# Patient Record
Sex: FEMALE | Race: WHITE
[De-identification: names, ages, dates, MRNs, and addresses within clinical notes are randomized per-mention and may not be internally consistent; named-entity substitution may affect disease eponyms.]

---

## 2018-11-21 PROBLEM — Z00.00 ENCOUNTER FOR PREVENTIVE HEALTH EXAMINATION: Status: ACTIVE | Noted: 2018-11-21

## 2018-12-27 ENCOUNTER — APPOINTMENT (OUTPATIENT)
Dept: ORTHOPEDIC SURGERY | Facility: CLINIC | Age: 83
End: 2018-12-27
Payer: MEDICARE

## 2018-12-27 VITALS — HEIGHT: 66 IN | BODY MASS INDEX: 21.69 KG/M2 | WEIGHT: 135 LBS | RESPIRATION RATE: 16 BRPM

## 2018-12-27 DIAGNOSIS — Z87.891 PERSONAL HISTORY OF NICOTINE DEPENDENCE: ICD-10-CM

## 2018-12-27 DIAGNOSIS — G56.03 CARPAL TUNNEL SYNDROM,BILATERAL UPPER LIMBS: ICD-10-CM

## 2018-12-27 DIAGNOSIS — I10 ESSENTIAL (PRIMARY) HYPERTENSION: ICD-10-CM

## 2018-12-27 DIAGNOSIS — M19.90 UNSPECIFIED OSTEOARTHRITIS, UNSPECIFIED SITE: ICD-10-CM

## 2018-12-27 PROCEDURE — 99203 OFFICE O/P NEW LOW 30 MIN: CPT

## 2018-12-27 RX ORDER — VALACYCLOVIR HYDROCHLORIDE 500 MG/1
500 TABLET, FILM COATED ORAL
Refills: 0 | Status: ACTIVE | COMMUNITY

## 2018-12-27 RX ORDER — CETIRIZINE HCL 10 MG
10 TABLET ORAL
Refills: 0 | Status: ACTIVE | COMMUNITY

## 2018-12-27 RX ORDER — TOLTERODINE TARTRATE 4 MG/1
4 CAPSULE, EXTENDED RELEASE ORAL
Refills: 0 | Status: ACTIVE | COMMUNITY

## 2018-12-27 RX ORDER — OLMESARTAN MEDOXOMIL 40 MG/1
40 TABLET, FILM COATED ORAL
Refills: 0 | Status: ACTIVE | COMMUNITY

## 2018-12-27 RX ORDER — ASPIRIN 81 MG
81 TABLET, DELAYED RELEASE (ENTERIC COATED) ORAL
Refills: 0 | Status: ACTIVE | COMMUNITY

## 2018-12-27 RX ORDER — AMLODIPINE BESYLATE 5 MG/1
5 TABLET ORAL
Refills: 0 | Status: ACTIVE | COMMUNITY

## 2018-12-27 RX ORDER — BISOPROLOL FUMARATE 5 MG/1
5 TABLET, FILM COATED ORAL
Refills: 0 | Status: ACTIVE | COMMUNITY

## 2023-03-10 ENCOUNTER — EMERGENCY (EMERGENCY)
Facility: HOSPITAL | Age: 88
LOS: 1 days | Discharge: HOME | End: 2023-03-10
Admitting: EMERGENCY MEDICINE
Payer: MEDICARE

## 2023-03-10 VITALS
TEMPERATURE: 98 F | OXYGEN SATURATION: 95 % | HEIGHT: 66 IN | SYSTOLIC BLOOD PRESSURE: 176 MMHG | HEART RATE: 92 BPM | WEIGHT: 134.92 LBS | DIASTOLIC BLOOD PRESSURE: 83 MMHG | RESPIRATION RATE: 16 BRPM

## 2023-03-10 PROCEDURE — 99285 EMERGENCY DEPT VISIT HI MDM: CPT

## 2023-03-10 PROCEDURE — 73030 X-RAY EXAM OF SHOULDER: CPT | Mod: 26,RT

## 2023-03-10 PROCEDURE — 73080 X-RAY EXAM OF ELBOW: CPT | Mod: 26,RT

## 2023-03-10 PROCEDURE — 73060 X-RAY EXAM OF HUMERUS: CPT | Mod: 26,RT

## 2023-03-10 PROCEDURE — 73060 X-RAY EXAM OF HUMERUS: CPT

## 2023-03-10 PROCEDURE — 73080 X-RAY EXAM OF ELBOW: CPT

## 2023-03-10 PROCEDURE — 73030 X-RAY EXAM OF SHOULDER: CPT

## 2023-03-10 PROCEDURE — 99284 EMERGENCY DEPT VISIT MOD MDM: CPT

## 2023-03-10 RX ORDER — IBUPROFEN 200 MG
600 TABLET ORAL ONCE
Refills: 0 | Status: COMPLETED | OUTPATIENT
Start: 2023-03-10 | End: 2023-03-10

## 2023-03-10 RX ADMIN — Medication 600 MILLIGRAM(S): at 20:43

## 2023-03-10 NOTE — ED PROVIDER NOTE - OBJECTIVE STATEMENT
87-year-old female with past medical history of hypertension, RHD complaining of right shoulder/arm pain status post trip and fall just prior to arrival.  Patient was getting into a cab, lost her balance and fell onto her right shoulder.  Denies head strike.  Patient not on any anticoagulation.   Admits to pain when she tries to move her arm.  Denies headache, neck pain, chest pain, shortness of breath.  Last tetanus 2019.

## 2023-03-10 NOTE — ED PROVIDER NOTE - NSFOLLOWUPINSTRUCTIONS_ED_ALL_ED_FT
Humerus Fracture Treated With Immobilization    Right arm and hand showing skeleton with a humerus fracture.   A humerus fracture is a break in the large bone in the upper arm (humerus). If the joint is stable and the bones are still in their normal position (nondisplaced), the injury may be treated with immobilization. This involves the use of a cast, splint, or sling to hold your arm in place. Immobilization ensures that your bones continue to stay in the correct position while your arm is healing.      What are the causes?    This condition may be caused by:  •A fall.      •A hard, direct hit to the arm.      •A motor vehicle accident.        What increases the risk?    The following factors may make you more likely to develop this condition:  •Having a disease that makes the bones thin and weak.      •Being elderly.        What are the signs or symptoms?    Symptoms of this condition include:  •Pain.      •Swelling.      •Bruising.      •Not being able to move your arm normally.        How is this diagnosed?    This condition may be diagnosed based on:  •A physical exam.      •X-rays of your upper arm, elbow, and shoulder.      •CT scan.        How is this treated?    Treatment for this condition involves wearing a cast, splint, or sling until the injured area is stable enough for you to begin range-of-motion exercises. You may also be prescribed pain medicine.      Follow these instructions at home:    If you have a nonremovable cast or splint:     • Do not put pressure on any part of the cast or splint until it is fully hardened. This may take several hours.      • Do not stick anything inside the cast or splint to scratch your skin. Doing that increases your risk of infection.      •Check the skin around the cast or splint every day. Tell your health care provider about any concerns.      •You may put lotion on dry skin around the edges of the cast or splint. Do not put lotion on the skin underneath the cast or splint.      •Keep the cast or splint clean and dry.      If you have a removable splint or sling:     •Wear the splint or sling as told by your health care provider. Remove it only as told by your health care provider.      •Check the skin around the splint or sling every day. Tell your health care provider about any concerns.      •Loosen the splint or sling if your fingers tingle, become numb, or turn cold and blue.      •Keep the splint or sling clean and dry.      Bathing     • Do not take baths, swim, or use a hot tub until your health care provider approves. Ask your health care provider if you may take showers. You may only be allowed to take sponge baths.    •If the cast, splint, or sling is not waterproof:  •Do not let it get wet.      •Cover it with a watertight covering when you take a bath or shower.          Managing pain, stiffness, and swelling   Bag of ice on a towel on the skin.  •If directed, put ice on the injured area. To do this:  •If you have a removable splint or sling, remove it as told by your health care provider.      •Put ice in a plastic bag.      •Place a towel between your skin and the bag or between your nonremovable cast or sling and the bag.      •Leave the ice on for 20 minutes, 2–3 times a day.      •Remove the ice if your skin turns bright red. This is very important. If you cannot feel pain, heat, or cold, you have a greater risk of damage to the area.        •Move your fingers often to reduce stiffness and swelling.      •Raise the injured area above the level of your heart while you are sitting or lying down.      Driving     • Do not drive or operate machinery while taking prescription pain medicine.      •Ask your health care provider when it is safe to drive if you have a cast, splint, or sling on your arm.      Activity     • Do not lift anything until your health care provider says that it is safe.      •Return to your normal activities as told by your health care provider. Ask your health care provider what activities are safe for you.      •Do range-of-motion exercises only as told by your health care provider or physical therapist.      General instructions     • Do not use any products that contain nicotine or tobacco. These products include cigarettes, chewing tobacco, and vaping devices, such as e-cigarettes. These can delay bone healing. If you need help quitting, ask your health care provider.      •Take over-the-counter and prescription medicines only as told by your health care provider.    •Ask your health care provider if the medicine prescribed to you:•Can cause constipation. You may need to take these actions to prevent or treat constipation:  •Drink enough fluid to keep your urine pale yellow.      •Take over-the-counter or prescription medicines.      •Eat foods that are high in fiber, such as beans, whole grains, and fresh fruits and vegetables.      •Limit foods that are high in fat and processed sugars, such as fried or sweet foods.          •Keep all follow-up visits. This is important.        Contact a health care provider if:    •You have any new pain, swelling, or bruising.      •Your pain, swelling, and bruising do not improve.      •Your cast, splint, or sling becomes loose or damaged.        Get help right away if:    •Your skin or fingers on your injured arm turn blue or gray.      •Your arm feels cold or numb.      •You have severe pain in your injured arm.        Summary    •A humerus fracture is a break in the large bone in the upper arm.      •Immobilization involves the use of a cast, splint, or sling to hold your arm in place while the injury heals.      •Wear a splint or sling as told by your health care provider. Remove it only as told by your health care provider.      •Move your fingers often to reduce stiffness and swelling.      This information is not intended to replace advice given to you by your health care provider. Make sure you discuss any questions you have with your health care provider. You have a fracture of the top of your shoulder.  You need to keep this in a sling.  Take Tylenol or Motrin as needed for pain.  Follow-up with the orthopedic surgeon within 1 week.  Return to ED for severe pain, headache, dizziness, numbness, tingling or any other concerning symptoms.    Humerus Fracture Treated With Immobilization    Right arm and hand showing skeleton with a humerus fracture.   A humerus fracture is a break in the large bone in the upper arm (humerus). If the joint is stable and the bones are still in their normal position (nondisplaced), the injury may be treated with immobilization. This involves the use of a cast, splint, or sling to hold your arm in place. Immobilization ensures that your bones continue to stay in the correct position while your arm is healing.      What are the causes?    This condition may be caused by:  •A fall.      •A hard, direct hit to the arm.      •A motor vehicle accident.        What increases the risk?    The following factors may make you more likely to develop this condition:  •Having a disease that makes the bones thin and weak.      •Being elderly.        What are the signs or symptoms?    Symptoms of this condition include:  •Pain.      •Swelling.      •Bruising.      •Not being able to move your arm normally.        How is this diagnosed?    This condition may be diagnosed based on:  •A physical exam.      •X-rays of your upper arm, elbow, and shoulder.      •CT scan.        How is this treated?    Treatment for this condition involves wearing a cast, splint, or sling until the injured area is stable enough for you to begin range-of-motion exercises. You may also be prescribed pain medicine.      Follow these instructions at home:    If you have a nonremovable cast or splint:     • Do not put pressure on any part of the cast or splint until it is fully hardened. This may take several hours.      • Do not stick anything inside the cast or splint to scratch your skin. Doing that increases your risk of infection.      •Check the skin around the cast or splint every day. Tell your health care provider about any concerns.      •You may put lotion on dry skin around the edges of the cast or splint. Do not put lotion on the skin underneath the cast or splint.      •Keep the cast or splint clean and dry.      If you have a removable splint or sling:     •Wear the splint or sling as told by your health care provider. Remove it only as told by your health care provider.      •Check the skin around the splint or sling every day. Tell your health care provider about any concerns.      •Loosen the splint or sling if your fingers tingle, become numb, or turn cold and blue.      •Keep the splint or sling clean and dry.      Bathing     • Do not take baths, swim, or use a hot tub until your health care provider approves. Ask your health care provider if you may take showers. You may only be allowed to take sponge baths.    •If the cast, splint, or sling is not waterproof:  •Do not let it get wet.      •Cover it with a watertight covering when you take a bath or shower.          Managing pain, stiffness, and swelling   Bag of ice on a towel on the skin.  •If directed, put ice on the injured area. To do this:  •If you have a removable splint or sling, remove it as told by your health care provider.      •Put ice in a plastic bag.      •Place a towel between your skin and the bag or between your nonremovable cast or sling and the bag.      •Leave the ice on for 20 minutes, 2–3 times a day.      •Remove the ice if your skin turns bright red. This is very important. If you cannot feel pain, heat, or cold, you have a greater risk of damage to the area.        •Move your fingers often to reduce stiffness and swelling.      •Raise the injured area above the level of your heart while you are sitting or lying down.      Driving     • Do not drive or operate machinery while taking prescription pain medicine.      •Ask your health care provider when it is safe to drive if you have a cast, splint, or sling on your arm.      Activity     • Do not lift anything until your health care provider says that it is safe.      •Return to your normal activities as told by your health care provider. Ask your health care provider what activities are safe for you.      •Do range-of-motion exercises only as told by your health care provider or physical therapist.      General instructions     • Do not use any products that contain nicotine or tobacco. These products include cigarettes, chewing tobacco, and vaping devices, such as e-cigarettes. These can delay bone healing. If you need help quitting, ask your health care provider.      •Take over-the-counter and prescription medicines only as told by your health care provider.    •Ask your health care provider if the medicine prescribed to you:•Can cause constipation. You may need to take these actions to prevent or treat constipation:  •Drink enough fluid to keep your urine pale yellow.      •Take over-the-counter or prescription medicines.      •Eat foods that are high in fiber, such as beans, whole grains, and fresh fruits and vegetables.      •Limit foods that are high in fat and processed sugars, such as fried or sweet foods.          •Keep all follow-up visits. This is important.        Contact a health care provider if:    •You have any new pain, swelling, or bruising.      •Your pain, swelling, and bruising do not improve.      •Your cast, splint, or sling becomes loose or damaged.        Get help right away if:    •Your skin or fingers on your injured arm turn blue or gray.      •Your arm feels cold or numb.      •You have severe pain in your injured arm.        Summary    •A humerus fracture is a break in the large bone in the upper arm.      •Immobilization involves the use of a cast, splint, or sling to hold your arm in place while the injury heals.      •Wear a splint or sling as told by your health care provider. Remove it only as told by your health care provider.      •Move your fingers often to reduce stiffness and swelling.      This information is not intended to replace advice given to you by your health care provider. Make sure you discuss any questions you have with your health care provider.

## 2023-03-10 NOTE — ED ADULT NURSE NOTE - OBJECTIVE STATEMENT
86 y/o female c/o right shoulder pain after fall trying to get into a taxi today. Pt denies head injury, denies pain in any other area of the body. Pt denies anticoagulants.

## 2023-03-10 NOTE — ED ADULT TRIAGE NOTE - OTHER COMPLAINTS
mechanical fall from standing height onto right shoulder. no head strike. painful right shoulder on movement. neurovascular intact.

## 2023-03-10 NOTE — ED PROVIDER NOTE - CARE PROVIDER_API CALL
Justo Patyon)  Orthopaedic Surgery  [address] [place] [address]  Phone: [phone]  Fax: [phone]  Follow Up Time:     Jorge Patel)  Orthopaedic Surgery  [address] [place] [address], [address]  [place] [address]  Phone: [phone]  Fax: [phone]  Follow Up Time:

## 2023-03-10 NOTE — CONSULT NOTE ADULT - SUBJECTIVE AND OBJECTIVE BOX
Orthopaedic Surgery Consult Note    For Surgeon: Dr. Payton    HPI:  87F s/p fall w/R shoulder pain. Denies any numbness/tingling. Pain mostly over anterior/lateral shoulder. Unable to lift 2/2 pain. Denies any pain/injury elsewhere. Denies head strike.      Vital Signs Last 24 Hrs  T(C): 36.6 (10 Mar 2023 19:21), Max: 36.6 (10 Mar 2023 19:21)  T(F): 97.9 (10 Mar 2023 19:21), Max: 97.9 (10 Mar 2023 19:21)  HR: 92 (10 Mar 2023 19:21) (92 - 92)  BP: 176/83 (10 Mar 2023 19:21) (176/83 - 176/83)  BP(mean): --  RR: 16 (10 Mar 2023 19:21) (16 - 16)  SpO2: 95% (10 Mar 2023 19:21) (95% - 95%)    Parameters below as of 10 Mar 2023 19:21  Patient On (Oxygen Delivery Method): room air    Physical Exam:  General: AAOx3, NAD  R Shoulder: Mild ecchymosis  TTP over Deltoid  Motor: Firing AIN/PIN/U/Ax  SILT grossly M/R/U/Ax  RP 2+    Imaging: XR showing mildly displaced proximal humerus fx    A/P: 87yFemale R Proximal Humerus Fx  - No surgical intervention  - Sling  - NWB  - F/u outpatient  - Pain control    Melita Roy, PGY-3  Orthopedic Surgery

## 2023-03-10 NOTE — ED PROVIDER NOTE - CLINICAL SUMMARY MEDICAL DECISION MAKING FREE TEXT BOX
87-year-old female with past medical history of hypertension, RHD complaining of right shoulder/arm pain status post trip and fall just prior to arrival.  Denies head strike.  No AC.  Denies headache, neck pain, chest pain, shortness of breath. tet utd.  R arm + tenderness to mid humerus, full passive ROM at shoulder and elbow,  +abrasion to elbow 87-year-old female with past medical history of hypertension, RHD complaining of right shoulder/arm pain status post trip and fall just prior to arrival.  Denies head strike.  No AC.  Denies headache, neck pain, chest pain, shortness of breath. tet utd.  R arm + tenderness to mid humerus, full passive ROM at shoulder and elbow,  +abrasion to elbow. XR shows humeral neck fx. 87-year-old female with past medical history of hypertension, RHD complaining of right shoulder/arm pain status post trip and fall just prior to arrival.  Denies head strike.  No AC.  Denies headache, neck pain, chest pain, shortness of breath. tet utd.  R arm + tenderness to mid humerus, full passive ROM at shoulder and elbow,  +abrasion to elbow. XR shows humeral neck fx. abrasion cleaned with bacitracin and dressed. spoke with ortho, NWB, sling with ortho f/u. Pt has family to help her.

## 2023-03-10 NOTE — ED PROVIDER NOTE - PHYSICAL EXAMINATION
CONSTITUTIONAL: Well-appearing;  in no apparent distress.   HEAD: Normocephalic; atraumatic.   EYES: PERRL; EOM intact; conjunctiva and sclera clear  ENT: normal nose; no rhinorrhea; normal pharynx with no erythema or lesions.   NECK: Supple; non-tender;   CARDIOVASCULAR: rrr,  RESPIRATORY: Breathing easily;   MSK: R arm + tenderness to mid humerus, full passive ROM at shoulder and elbow,  +abrasion to elbow   EXT: No cyanosis or edema; N/V intact  SKIN: Normal for age and race; warm; dry; good turgor; no apparent lesions or rash.

## 2023-03-10 NOTE — ED PROVIDER NOTE - PATIENT PORTAL LINK FT
You can access the FollowMyHealth Patient Portal offered by [facility] by registering at the following website: [email or web address]. By joining [facility]’s FollowMyHealth portal, you will also be able to view your health information using other applications (apps) compatible with our system.
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
## 2023-03-14 DIAGNOSIS — I10 ESSENTIAL (PRIMARY) HYPERTENSION: ICD-10-CM

## 2023-03-14 DIAGNOSIS — S50.311A ABRASION OF RIGHT ELBOW, INITIAL ENCOUNTER: ICD-10-CM

## 2023-03-14 DIAGNOSIS — M25.511 PAIN IN RIGHT SHOULDER: ICD-10-CM

## 2023-03-14 DIAGNOSIS — Z88.8 ALLERGY STATUS TO OTHER DRUGS, MEDICAMENTS AND BIOLOGICAL SUBSTANCES: ICD-10-CM
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
## 2023-03-14 DIAGNOSIS — S42.291A OTHER DISPLACED FRACTURE OF UPPER END OF RIGHT HUMERUS, INITIAL ENCOUNTER FOR CLOSED FRACTURE: ICD-10-CM

## 2023-03-14 DIAGNOSIS — Z88.0 ALLERGY STATUS TO PENICILLIN: ICD-10-CM
